# Patient Record
(demographics unavailable — no encounter records)

---

## 2025-07-09 NOTE — ASSESSMENT
[FreeTextEntry1] : LINK GREENBERG is a 58-year y/o M with history and physical exam consistent with left knee pain, consistent with suspected gout flare given atraumatic knee pain and swelling.  - Recommend patient c/w Celebrex as prescribed.   - Recommend rest, ice, compression, elevation - Recommend activity modification, avoid strenuous or high impact activities  - Patient has tried OTC's medications including Ibuprofen, Aleve, etc or prescription NSAIDS, and/or exercises at home and/or physical therapy without satisfactory response,   Patient had decreased mobility in the joint and the risks benefits, and alternatives have been discussed, and verbal consent was obtained.   PROCEDURE: Large joint aspiration and injection was performed of the LEFT Knee. The indication for this procedure was swelling, pain and inflammation. The anterolateral site of the knee was prepped with betadine and sterile technique used. A 18-Gauge needle was used to aspirate a total of 65cc of straw colored synovial joint fluid and inject a total of 5cc which comprised of 4cc of 1% Lidocaine and 1cc of 40mg Kenalog.   The patient tolerated the procedure well. There was no bleeding, a band aid was applied.  Patient was advised to call if redness, pain or fever occur and to apply ice for 15 minutes out of every hour for the next 12-24 hours as tolerated as soreness is common once the intra-articular lidocaine wears off.  Rx provided for joint fluid analysis - cell count, crystals, gram stain and culture.   Patient was educated on their diagnosis today. Emphasized the importance of gentle stretching and strengthening. Patient expressed understanding and all questions were answered today.   Follow up after results from synovial joint fluid evaluation to discuss further management.

## 2025-07-09 NOTE — IMAGING
[de-identified] :   LEFT Knee examined.   Patient is in no acute distress, alert and oriented   Inspection: Skin is intact + Effusion Atrophy is not present Antalgic gait is not present   Palpation: + Warmth noted  ++ Medial joint line tenderness + Lateral joint line tenderness - Patellar tenderness - Pes anserine bursa - Popliteal fullness   Calf soft and nontender   Motion: Knee extension is 0 Knee flexion is 85 with pain    Strength: Quadriceps strength is 5/5 Hamstring strength is 5/5   Special Tests: Lachman is normal Posterior drawer is normal Varus stress stability is normal Valgus stress stability is normal   - Medial Cholo test - Lateral Cholo test Patellofemoral grind test is normal Patellar apprehension test is normal   NV exam grossly intact distally. Sensation is grossly intact to light touch in the foot Motor function is 5/5 in the foot Capillary refill is less than 2 seconds in the toes Lymphadenopathy is not present Peripheral edema is not present   [facility] 7/8/25 X-ray left knee, independently reviewed and interpreted. IMPRESSION: No acute bony pathology. Joint effusion noted.

## 2025-07-09 NOTE — HISTORY OF PRESENT ILLNESS
[de-identified] : 07/09/2025 HPI: LINK GREENBERG is a 58 year y/o M who presents for left knee pain for four days. NKI. Patient notes dull ache from his left hip into his toes of his LLE. Tuesday morning, he states he was unable to put weight on the LLE. Patient had rx for Celebrex prescribed by Dr. Jhaveri. Patient went to [facility] ER and he was prescribed oxycodone which helps him to sleep. Patient has also been using a compressive knee sleeve.   Patient notes hx of gout in the past. He notes he has not had a flare up in years and he states that his flare ups usually bother his right foot.    Patient has had a previous examination with [facility] ER for this issue.   Patient has had previous imaging performed at [facility] ER of left knee.    Patient denies PMHx of DM. Patient is not on a blood thinner - on ASA 81mg.     Patient presents today, 58 year M, for evaluation of their left knee Date of Injury/Onset: 4 days Mechanism of injury: NKI This is not a Work-Related Injury being treated under Worker's Compensation. This is not an athletic injury occurring associated with an interscholastic or organized sports team.   Pain: At Rest: 4 /10 With Activity: 7 /10 Quality of symptoms: pain and swelling in the left knee   Affecting Sleep: Yes Stiffness upon waking, lasting greater than 30mins: Better today Difficulty with stairs: Yes Difficulty getting in and out of car: Yes Difficulty applying shoe: Yes Sit to stand stiffness: Yes Affects walking short/long distances? Yes Home/Work/Recreation affected? Yes   Improves with:  ok when sitting Worse with:  activity Have you been treated for this in the past? [facility] 7/8/25, gave crutches and did x-ray Have you had surgery for this in the past? none   OTC Medicines: none RX medicines:  Oxycodone Acetaminophen 5-325 prescribed by [facility]; is taking Celecoxib prescribed for right knee Heat, Ice, Elevation: Ice   CSI or Gel Injections: None Physical Therapy/ HEP: None   Previous Treatment/Imaging/Studies Since Last Visit: [facility] 7/8/25 X-ray left knee

## 2025-07-23 NOTE — IMAGING
[de-identified] :   LEFT Knee examined.   Patient is in no acute distress, alert and oriented   Inspection: Skin is intact - Effusion Atrophy is not present Antalgic gait is not present   Palpation: - Warmth noted  - Medial joint line tenderness - Lateral joint line tenderness - Patellar tenderness - Pes anserine bursa - Popliteal fullness   Calf soft and nontender   Motion: Knee extension is 0 Knee flexion is 120 with no pain    Strength: Quadriceps strength is 5/5 Hamstring strength is 5/5   Special Tests: Lachman is normal Posterior drawer is normal Varus stress stability is normal Valgus stress stability is normal   - Medial Cholo test - Lateral Cholo test Patellofemoral grind test is normal Patellar apprehension test is normal   NV exam grossly intact distally. Sensation is grossly intact to light touch in the foot Motor function is 5/5 in the foot Capillary refill is less than 2 seconds in the toes Lymphadenopathy is not present Peripheral edema is not present   Lab results reviewed. + CPPD   [facility] [place] 7/8/25 X-ray left knee, independently reviewed and interpreted. IMPRESSION: No acute bony pathology. Joint effusion noted.

## 2025-07-23 NOTE — HISTORY OF PRESENT ILLNESS
[de-identified] : 07/23/2025: LINK GREENBERG is a 58 year y/o M who presents for a f/u evaluation of left knee pain. Patient has had very good improvement with left knee aspiration and CSI injection done in office at last visit. Patient still notes medial joint line pain when keeping his left knee flexed for prolonged periods of time. he states the pain is an ache and he denies any mechanical symptoms such as buckling or locking that would necessitate an MRI today.   07/09/2025 HPI: LINK GREENBERG is a 58 year y/o M who presents for left knee pain for four days. NKI. Patient notes dull ache from his left hip into his toes of his LLE. Tuesday morning, he states he was unable to put weight on the LLE. Patient had rx for Celebrex prescribed by Dr. Jhaveri. Patient went to [facility] ER and he was prescribed oxycodone which helps him to sleep. Patient has also been using a compressive knee sleeve.   Patient notes hx of gout in the past. He notes he has not had a flare up in years and he states that his flare ups usually bother his right foot.    Patient has had a previous examination with [facility] ER for this issue.   Patient has had previous imaging performed at [facility] ER of left knee.    Patient denies PMHx of DM. Patient is not on a blood thinner - on ASA 81mg.     Patient presents today, 58 year M, for evaluation of their left knee Date of Injury/Onset: 4 days Mechanism of injury: NKI This is not a Work-Related Injury being treated under Worker's Compensation. This is not an athletic injury occurring associated with an interscholastic or organized sports team.   Pain: At Rest: 4 /10 With Activity: 7 /10 Quality of symptoms: pain and swelling in the left knee   Affecting Sleep: Yes Stiffness upon waking, lasting greater than 30mins: Better today Difficulty with stairs: Yes Difficulty getting in and out of car: Yes Difficulty applying shoe: Yes Sit to stand stiffness: Yes Affects walking short/long distances? Yes Home/Work/Recreation affected? Yes   Improves with:  ok when sitting Worse with:  activity Have you been treated for this in the past? [facility] 7/8/25, gave crutches and did x-ray Have you had surgery for this in the past? none   OTC Medicines: none RX medicines:  Oxycodone Acetaminophen 5-325 prescribed by [facility]; is taking Celecoxib prescribed for right knee Heat, Ice, Elevation: Ice   CSI or Gel Injections: None Physical Therapy/ HEP: None   Previous Treatment/Imaging/Studies Since Last Visit: [facility] 7/8/25 X-ray left knee

## 2025-07-23 NOTE — ASSESSMENT
[FreeTextEntry1] : LINK GREENBERG is a 58-year y/o M with history and physical exam consistent with left knee pain, consistent with pseudogout given +CPPD. Symptoms have completely resolved with joint aspiration and CSI at his previous visit on 07/09.  - Recommend patient c/w Celebrex as prescribed.   - Recommend rest, ice, compression, elevation - Recommend activity modification, avoid strenuous or high impact activities   Patient to follow up as needed. Patient was instructed to call for re-evaluation if symptoms worsen or change.  Recommended patient f/u with rheumatologist if symptoms return - can consider maintenance medication for pseudogout at that point.